# Patient Record
(demographics unavailable — no encounter records)

---

## 2024-12-11 NOTE — ASSESSMENT
[FreeTextEntry1] : Manuelito is a 17 year old male with a ganglion cyst of the left wrist.   Today's assessment was performed with the assistance of the patient's parent as an independent historian given the patient's age, who could not be considered a reliable history/due to the nonverbal nature given the patient's young age. Clinical findings and x-ray results were reviewed at length with the patient and parent. The condition, natural history, and prognosis were explained to the patient and family. This is likely a ganglion cyst. The recommendation at this time is observation only vs. surgical excision. We discussed at length that the cyst is a benign condition and does not acutely require surgical management, as it may resolve with time. Patient and his mother are going to consider treatment options and think about surgical excision if it continues to cause him discomfort. We provided him with the name of my partner, Dr. Eastman, to follow up with if they chose to proceed with surgical management. Reassurance provided. He may follow up in the office on an as needed basis or return sooner if any new concerns should arise.   All questions and concerns were addressed today. Parent and patient verbalize understanding and agree with plan of care.  I, Sade Patel PA-C, have acted as a scribe and documented the above information for Dr. Cervantes.

## 2024-12-11 NOTE — REASON FOR VISIT
[Initial Evaluation] : an initial evaluation [Patient] : patient [Mother] : mother [FreeTextEntry1] : bump of left wrist

## 2024-12-11 NOTE — PHYSICAL EXAM
[FreeTextEntry1] : GENERAL: alert, cooperative, in NAD SKIN: The skin is intact, warm, pink and dry over the area examined. EYES: Normal conjunctiva, normal eyelids and pupils were equal and round. ENT: normal ears, normal nose and normal lips. CARDIOVASCULAR: brisk capillary refill, but no peripheral edema. RESPIRATORY: The patient is in no apparent respiratory distress. They're taking full deep breaths without use of accessory muscles or evidence of audible wheezes or stridor without the use of a stethoscope. Normal respiratory effort. ABDOMEN: not examined  Left Wrist: (+) bump noted about the volar radial aspect of wrist. No inflammation or erythema. No tenderness of bony prominences of soft tissue structures. No anatomical snuffbox tenderness. Full range of motion with extension, flexion, ulnar and radial deviation without stiffness. Fingers are warm, pink, and moving freely. Able to fully supinate and pronate forearm. Radial pulse is +2 B/L. Brisk capillary refill in all 5 fingers. Sensation is intact to light touch distally. Nerve innervation of the hand is intact. 5/5 Strength.

## 2024-12-11 NOTE — DATA REVIEWED
[de-identified] : X-rays of the Left Wrist (3 views) were obtained and independently reviewed at today's office visit on 12/11/24: No acute fracture or dislocation noted. Joint spaces are preserved.

## 2024-12-11 NOTE — REVIEW OF SYSTEMS
[Joint Pains] : arthralgias [Appropriate Age Development] : development appropriate for age [Change in Activity] : no change in activity [Fever Above 102] : no fever [Malaise] : no malaise [Rash] : no rash [Itching] : no itching [Eye Pain] : no eye pain [Redness] : no redness [Nasal Stuffiness] : no nasal congestion [Sore Throat] : no sore throat [Heart Problems] : no heart problems [Tachypnea] : no tachypnea [Wheezing] : no wheezing [Vomiting] : no vomiting [Limping] : no limping [Joint Swelling] : no joint swelling

## 2024-12-11 NOTE — HISTORY OF PRESENT ILLNESS
[FreeTextEntry1] : Manuelito is a 17 year old, otherwise healthy, male presenting to the office today with his mother for initial pediatric orthopedic evaluation of a left wrist bump. Patient reports he noticed a bump about the volar aspect of the left wrist approximately 8 months ago. He denies any injury or trauma to the area. He reports some mild discomfort about the wrist and the bump when he is playing football, though he has been able to continue participating without restrictions. He is not requiring pain medications. He denies any swelling, bruising, erythema, or other overlying skin changes over the area. He denies numbness, tingling, radiating pain, or weakness throughout the left upper extremity. Denies recent fever or chills. He presents today for further orthopedic evaluation.

## 2024-12-11 NOTE — CONSULT LETTER
[Dear  ___] : Dear  [unfilled], [Consult Letter:] : I had the pleasure of evaluating your patient, [unfilled]. [Please see my note below.] : Please see my note below. [Consult Closing:] : Thank you very much for allowing me to participate in the care of this patient.  If you have any questions, please do not hesitate to contact me. [Sincerely,] : Sincerely, [FreeTextEntry3] : Fortino Cervantes MD [facility] [address] [place] [address] Phone: [phone] Fax: [phone]